# Patient Record
Sex: MALE | Race: WHITE | URBAN - METROPOLITAN AREA
[De-identification: names, ages, dates, MRNs, and addresses within clinical notes are randomized per-mention and may not be internally consistent; named-entity substitution may affect disease eponyms.]

---

## 2022-04-29 ENCOUNTER — TELEPHONE (OUTPATIENT)
Dept: FAMILY MEDICINE CLINIC | Facility: CLINIC | Age: 47
End: 2022-04-29

## 2022-04-29 NOTE — TELEPHONE ENCOUNTER
Attempted to reach patient with no success. LM to return call; informed patient he will need to schedule a visit with a provider in order to receive a letter for work. Contact information for scheduling provided.

## 2025-07-22 ENCOUNTER — OFFICE VISIT (OUTPATIENT)
Facility: CLINIC | Age: 50
End: 2025-07-22
Payer: MEDICAID

## 2025-07-22 VITALS
SYSTOLIC BLOOD PRESSURE: 124 MMHG | DIASTOLIC BLOOD PRESSURE: 76 MMHG | RESPIRATION RATE: 20 BRPM | HEART RATE: 94 BPM | OXYGEN SATURATION: 98 % | WEIGHT: 220 LBS | BODY MASS INDEX: 36 KG/M2

## 2025-07-22 DIAGNOSIS — J45.50 SEVERE PERSISTENT ASTHMA WITHOUT COMPLICATION (HCC): ICD-10-CM

## 2025-07-22 DIAGNOSIS — R05.3 CHRONIC COUGH: Primary | ICD-10-CM

## 2025-07-22 PROCEDURE — 99204 OFFICE O/P NEW MOD 45 MIN: CPT | Performed by: INTERNAL MEDICINE

## 2025-07-22 RX ORDER — GLIMEPIRIDE 2 MG/1
TABLET ORAL
COMMUNITY

## 2025-07-22 RX ORDER — ALBUTEROL SULFATE 90 UG/1
INHALANT RESPIRATORY (INHALATION)
COMMUNITY
Start: 2024-04-11

## 2025-07-22 RX ORDER — MONTELUKAST SODIUM 10 MG/1
10 TABLET ORAL DAILY
COMMUNITY

## 2025-07-22 RX ORDER — ROSUVASTATIN CALCIUM 20 MG/1
20 TABLET, COATED ORAL DAILY
COMMUNITY

## 2025-07-22 RX ORDER — SITAGLIPTIN 100 MG/1
100 TABLET, FILM COATED ORAL DAILY
COMMUNITY

## 2025-07-22 NOTE — PROGRESS NOTES
The following individual(s) verbally consented to be recorded using ambient AI listening technology and understand that they can each withdraw their consent to this listening technology at any point by asking the clinician to turn off or pause the recording:    Patient name: Kanwal Blair  Additional names:

## 2025-07-23 NOTE — PROGRESS NOTES
[facility] General Pulmonary Consult Note    Chief Complaint:  Chief Complaint   Patient presents with    New Patient     Pt states, having a cough for 2 years       History of Present Illness:  History of Present Illness  Kanwal Blair is a 50 year old male with asthma and allergies who presents with a persistent cough.    He has experienced a persistent cough since August 2023, following exposure to loose insulation in his apartment. The cough worsened whenever the insulation was disturbed, leading to two emergency room visits. Despite moving out of the apartment in April 2024, the cough has persisted.    His asthma and allergies were previously well-controlled, but the cough has not improved despite treatment. He has consulted multiple specialists, including an allergist and two pulmonologists, and has undergone various tests, including lung function tests and CT scans. A CT scan in April 2024 showed partial lung collapse, and a subsequent scan in November 2024 appeared to be clear according to the patient's recollection.    He has been on medications such as prednisone, fluticasone, and Trelegy, but these have not alleviated his symptoms and have caused adverse effects, including elevated blood sugar levels. He discontinued these medications when his blood sugar reached 461 mg/dL.    The cough causes constant pressure in the center of his chest and throat irritation. He has adapted by breathing shallowly to avoid triggering the cough. The persistent coughing has also exacerbated a pre-existing hernia.    He is currently unemployed and seeking work, but the cough and its effects have impacted his ability to work. He has been living with his mother since moving out of his previous apartment.      Past Medical History:   Past Medical History[1]     Past Surgical History: Past Surgical History[2]    Family Medical History: Family History[3]     Social History:   Social History     Socioeconomic History    Marital  status: Unknown     Spouse name: Not on file    Number of children: Not on file    Years of education: Not on file    Highest education level: Not on file   Occupational History    Not on file   Tobacco Use    Smoking status: Never    Smokeless tobacco: Never   Substance and Sexual Activity    Alcohol use: Not on file     Comment: occ    Drug use: Not on file    Sexual activity: Not on file   Other Topics Concern    Not on file   Social History Narrative    Not on file     Social Drivers of Health     Food Insecurity: Not on file   Transportation Needs: Not on file   Stress: Not on file   Housing Stability: Low Risk  (6/28/2024)    Received from [facility]    Housing Stability     Mortgage Payment Concerns?: Not on file     Number of Places Lived in the Last Year: Not on file     Unstable Housing?: Not on file        Allergies: Penicillins     Medications: Current Medications[4]    Review of Systems: Review of Systems    Physical Exam:  /76 (BP Location: Right arm, Patient Position: Sitting, Cuff Size: adult)   Pulse 94   Resp 20   Wt 220 lb (99.8 kg)   SpO2 98%   BMI 35.51 kg/m²      Constitutional: alert, cooperative. No acute distress.  HEENT: Head NC/AT. Nares normal. Septum midline. Mucosa normal. No drainage or sinus tenderness.. Mallampati 2+  Cardio: Regular rate and rhythm. Normal S1 and S2. No murmurs.   Respiratory: Thorax symmetrical with no labored breathing. clear to auscultation bilaterally  GI: NABS. Abd soft, non-tender.  Extremities: No clubbing or cyanosis. No BLE edema.    Neurologic: A&Ox3. No gross motor deficits.  Skin: Warm, dry  Psych: Calm, cooperative. Pleasant affect.    Results:  Images personally reviewed - my own review dictated as below  Results  RADIOLOGY  CT scan: Partial lung collapse (atelectasis) (04/2024)  CT scan: Clear (11/2024)    DIAGNOSTIC  Pulmonary function test: FEV1 2.28 L (71% predicted), FVC 3.32 L (83% predicted), FEV1/FVC ratio 69,  borderline obstruction, no significant improvement post-albuterol, normal lung capacity, normal diffusion capacity  No CBC panel on file.     No CMP panel on file.     No results found.     Assessment/Plan:  Assessment & Plan  Chronic cough  Persistent cough since August 2023, not resolved with allergy or asthma treatment. CT scan clear, mild obstruction on PFTs, no significant albuterol response. Possible inhalation injury or cough damage, but CT does not support inhalation injury.  - Order repeat chest CT scan to rule out inhalational injury or other abnormalities.  - Consider bronchoscopy if CT scan is inconclusive.    Mild asthma  Mild obstruction and increased eosinophils on PFTs. Symptoms not improved with albuterol or Trelegy. Discussed potential asthma shot for uncontrolled symptoms.  - Consider trial of different inhaler formulation if CT scan is clear and symptoms persist.  - Discuss potential asthma shot if symptoms suggest uncontrolled asthma.    Hernia exacerbation due to coughing  Hernia exacerbated by chronic coughing, scheduled for CT scan of lower abdomen.  - Coordinate CT scan of chest with scheduled CT scan of lower abdomen.        Return in about 4 weeks (around 8/19/2025).    Nicholas Phillips MD  7/23/2025         [1] History reviewed. No pertinent past medical history.  [2]   Past Surgical History:  Procedure Laterality Date    Vasectomy  05/06/2020    Dr. Madsen    [3]   Family History  Problem Relation Age of Onset    Other (heart deise) Father    [4]   Current Outpatient Medications   Medication Sig Dispense Refill    albuterol 108 (90 Base) MCG/ACT Inhalation Aero Soln INHALE 2 PUFFS INTO THE LUNGS EVERY 6 HOURS AS NEEDED AS DIRECTED      glimepiride 2 MG Oral Tab       JANUVIA 100 MG Oral Tab Take 1 tablet (100 mg total) by mouth daily.      rosuvastatin 20 MG Oral Tab Take 1 tablet (20 mg total) by mouth daily.      montelukast 10 MG Oral Tab Take 1 tablet (10 mg total) by mouth  daily.

## 2025-08-01 ENCOUNTER — TELEPHONE (OUTPATIENT)
Facility: CLINIC | Age: 50
End: 2025-08-01